# Patient Record
Sex: FEMALE | Race: WHITE | NOT HISPANIC OR LATINO | Employment: UNEMPLOYED | URBAN - METROPOLITAN AREA
[De-identification: names, ages, dates, MRNs, and addresses within clinical notes are randomized per-mention and may not be internally consistent; named-entity substitution may affect disease eponyms.]

---

## 2019-09-19 ENCOUNTER — HOSPITAL ENCOUNTER (OUTPATIENT)
Dept: URGENT CARE | Facility: CLINIC | Age: 29
Discharge: HOME | End: 2019-09-19
Attending: NURSE PRACTITIONER

## 2022-03-02 ENCOUNTER — TRANSCRIBE ORDERS (OUTPATIENT)
Dept: ADMINISTRATIVE | Facility: HOSPITAL | Age: 32
End: 2022-03-02

## 2022-03-02 ENCOUNTER — HOSPITAL ENCOUNTER (OUTPATIENT)
Dept: CARDIOLOGY | Facility: HOSPITAL | Age: 32
Discharge: HOME | End: 2022-03-02
Admitting: NURSE PRACTITIONER

## 2022-03-02 DIAGNOSIS — R07.9 CHEST PAIN, UNSPECIFIED TYPE: Primary | ICD-10-CM

## 2022-03-02 DIAGNOSIS — R07.9 CHEST PAIN, UNSPECIFIED TYPE: ICD-10-CM

## 2022-03-02 PROCEDURE — 93005 ELECTROCARDIOGRAM TRACING: CPT | Performed by: NURSE PRACTITIONER

## 2022-03-02 PROCEDURE — 93010 ELECTROCARDIOGRAM REPORT: CPT | Performed by: SPECIALIST

## 2022-03-16 ENCOUNTER — HOSPITAL ENCOUNTER (EMERGENCY)
Facility: HOSPITAL | Age: 32
Discharge: LEFT BEFORE DISCHARGE | End: 2022-03-16

## 2022-03-16 PROCEDURE — 99211 OFF/OP EST MAY X REQ PHY/QHP: CPT

## 2022-03-23 LAB — QT INTERVAL: 361 MS

## 2022-05-05 ENCOUNTER — HOSPITAL ENCOUNTER (EMERGENCY)
Facility: HOSPITAL | Age: 32
Discharge: HOME | End: 2022-05-05
Attending: EMERGENCY MEDICINE | Admitting: EMERGENCY MEDICINE

## 2022-05-05 VITALS
DIASTOLIC BLOOD PRESSURE: 44 MMHG | RESPIRATION RATE: 18 BRPM | TEMPERATURE: 98.2 F | SYSTOLIC BLOOD PRESSURE: 100 MMHG | OXYGEN SATURATION: 95 % | HEIGHT: 64 IN | HEART RATE: 71 BPM

## 2022-05-05 DIAGNOSIS — R56.9 SEIZURE: Primary | ICD-10-CM

## 2022-05-05 LAB
ALBUMIN SERPL-MCNC: 4 G/DL (ref 3.5–5.2)
ALBUMIN/GLOB SERPL: 1.1 G/DL
ALP SERPL-CCNC: 91 U/L (ref 39–117)
ALT SERPL W P-5'-P-CCNC: 16 U/L (ref 1–33)
ANION GAP SERPL CALCULATED.3IONS-SCNC: 13.9 MMOL/L (ref 5–15)
AST SERPL-CCNC: 17 U/L (ref 1–32)
BASOPHILS # BLD AUTO: 0.03 10*3/MM3 (ref 0–0.2)
BASOPHILS NFR BLD AUTO: 0.4 % (ref 0–1.5)
BILIRUB SERPL-MCNC: <0.2 MG/DL (ref 0–1.2)
BUN SERPL-MCNC: 11 MG/DL (ref 6–20)
BUN/CREAT SERPL: 11.5 (ref 7–25)
CALCIUM SPEC-SCNC: 9.6 MG/DL (ref 8.6–10.5)
CHLORIDE SERPL-SCNC: 101 MMOL/L (ref 98–107)
CK SERPL-CCNC: 143 U/L (ref 20–180)
CO2 SERPL-SCNC: 20.1 MMOL/L (ref 22–29)
CREAT SERPL-MCNC: 0.96 MG/DL (ref 0.57–1)
D-LACTATE SERPL-SCNC: 1.1 MMOL/L (ref 0.5–2)
DEPRECATED RDW RBC AUTO: 45.1 FL (ref 37–54)
EGFRCR SERPLBLD CKD-EPI 2021: 81.3 ML/MIN/1.73
EOSINOPHIL # BLD AUTO: 0.35 10*3/MM3 (ref 0–0.4)
EOSINOPHIL NFR BLD AUTO: 4.8 % (ref 0.3–6.2)
ERYTHROCYTE [DISTWIDTH] IN BLOOD BY AUTOMATED COUNT: 14.6 % (ref 12.3–15.4)
GLOBULIN UR ELPH-MCNC: 3.6 GM/DL
GLUCOSE SERPL-MCNC: 99 MG/DL (ref 65–99)
HCG INTACT+B SERPL-ACNC: <0.5 MIU/ML
HCT VFR BLD AUTO: 36.2 % (ref 34–46.6)
HGB BLD-MCNC: 12 G/DL (ref 12–15.9)
HOLD SPECIMEN: NORMAL
HOLD SPECIMEN: NORMAL
IMM GRANULOCYTES # BLD AUTO: 0.01 10*3/MM3 (ref 0–0.05)
IMM GRANULOCYTES NFR BLD AUTO: 0.1 % (ref 0–0.5)
LYMPHOCYTES # BLD AUTO: 2.59 10*3/MM3 (ref 0.7–3.1)
LYMPHOCYTES NFR BLD AUTO: 35.6 % (ref 19.6–45.3)
MAGNESIUM SERPL-MCNC: 2.3 MG/DL (ref 1.6–2.6)
MCH RBC QN AUTO: 28.4 PG (ref 26.6–33)
MCHC RBC AUTO-ENTMCNC: 33.1 G/DL (ref 31.5–35.7)
MCV RBC AUTO: 85.6 FL (ref 79–97)
MONOCYTES # BLD AUTO: 0.59 10*3/MM3 (ref 0.1–0.9)
MONOCYTES NFR BLD AUTO: 8.1 % (ref 5–12)
NEUTROPHILS NFR BLD AUTO: 3.71 10*3/MM3 (ref 1.7–7)
NEUTROPHILS NFR BLD AUTO: 51 % (ref 42.7–76)
NRBC BLD AUTO-RTO: 0 /100 WBC (ref 0–0.2)
PLATELET # BLD AUTO: 409 10*3/MM3 (ref 140–450)
PMV BLD AUTO: 10.9 FL (ref 6–12)
POTASSIUM SERPL-SCNC: 4.2 MMOL/L (ref 3.5–5.2)
PROLACTIN SERPL-MCNC: 13.3 NG/ML (ref 4.79–23.3)
PROT SERPL-MCNC: 7.6 G/DL (ref 6–8.5)
RBC # BLD AUTO: 4.23 10*6/MM3 (ref 3.77–5.28)
SODIUM SERPL-SCNC: 135 MMOL/L (ref 136–145)
T4 FREE SERPL-MCNC: 1.14 NG/DL (ref 0.93–1.7)
TSH SERPL DL<=0.05 MIU/L-ACNC: 0.9 UIU/ML (ref 0.27–4.2)
WBC NRBC COR # BLD: 7.28 10*3/MM3 (ref 3.4–10.8)
WHOLE BLOOD HOLD SPECIMEN: NORMAL
WHOLE BLOOD HOLD SPECIMEN: NORMAL

## 2022-05-05 PROCEDURE — 84439 ASSAY OF FREE THYROXINE: CPT | Performed by: EMERGENCY MEDICINE

## 2022-05-05 PROCEDURE — 85025 COMPLETE CBC W/AUTO DIFF WBC: CPT | Performed by: EMERGENCY MEDICINE

## 2022-05-05 PROCEDURE — 84146 ASSAY OF PROLACTIN: CPT | Performed by: EMERGENCY MEDICINE

## 2022-05-05 PROCEDURE — 83605 ASSAY OF LACTIC ACID: CPT | Performed by: EMERGENCY MEDICINE

## 2022-05-05 PROCEDURE — 99283 EMERGENCY DEPT VISIT LOW MDM: CPT

## 2022-05-05 PROCEDURE — 0 LEVETIRACETAM IN NACL 0.75% 1000 MG/100ML SOLUTION: Performed by: EMERGENCY MEDICINE

## 2022-05-05 PROCEDURE — 80053 COMPREHEN METABOLIC PANEL: CPT | Performed by: EMERGENCY MEDICINE

## 2022-05-05 PROCEDURE — 80177 DRUG SCRN QUAN LEVETIRACETAM: CPT | Performed by: EMERGENCY MEDICINE

## 2022-05-05 PROCEDURE — 83735 ASSAY OF MAGNESIUM: CPT | Performed by: EMERGENCY MEDICINE

## 2022-05-05 PROCEDURE — 82550 ASSAY OF CK (CPK): CPT | Performed by: EMERGENCY MEDICINE

## 2022-05-05 PROCEDURE — 84443 ASSAY THYROID STIM HORMONE: CPT | Performed by: EMERGENCY MEDICINE

## 2022-05-05 PROCEDURE — 96374 THER/PROPH/DIAG INJ IV PUSH: CPT

## 2022-05-05 PROCEDURE — 84702 CHORIONIC GONADOTROPIN TEST: CPT | Performed by: EMERGENCY MEDICINE

## 2022-05-05 RX ORDER — SODIUM CHLORIDE 0.9 % (FLUSH) 0.9 %
10 SYRINGE (ML) INJECTION AS NEEDED
Status: DISCONTINUED | OUTPATIENT
Start: 2022-05-05 | End: 2022-05-05 | Stop reason: HOSPADM

## 2022-05-05 RX ORDER — LEVETIRACETAM 10 MG/ML
1000 INJECTION INTRAVASCULAR EVERY 12 HOURS SCHEDULED
Status: DISCONTINUED | OUTPATIENT
Start: 2022-05-05 | End: 2022-05-05 | Stop reason: HOSPADM

## 2022-05-05 RX ADMIN — LEVETIRACETAM 1000 MG: 10 INJECTION INTRAVENOUS at 14:51

## 2022-05-05 NOTE — ED PROVIDER NOTES
Time: 2:29 PM EDT  Arrived by: private car; accompanied by significant other   Chief Complaint: SEIZURE   History provided by: pt and significant other   History is limited by: N/A     History of Present Illness:  Patient is a 32 y.o. female that presents to the emergency department with multiple episodes of SEIZURES today. Per significant other, pt has had 16 seizures. Pt is not actively seizing when seen in the ED. Seizures were witnessed by significant other. Significant other states that pt does not consistently eat or sleeps consistent.     Pt takes 2,000 mg Keppra daily and 100 mg Lamictal daily and significant other notes that pt is noncompliant with taking the medications. Pt also recently had a VNS stimulator placed.        History provided by:  Patient (significant other )  Seizures  Seizure activity on arrival: no    Seizure type:  Unable to specify  Severity:  Moderate  Timing:  Intermittent  Number of seizures this episode:  16  Progression:  Resolved  History of seizures: yes    Similar to previous episodes: yes    Severity:  Moderate  Home seizure meds: Lamictal and Keppra.  Compliance with current therapy:  Poor      Similar Symptoms Previously: Pt has hx of seizure. Per family, she has had seizures for the past year.   Recently seen: not recently seen in this ED     Patient Care Team  Primary Care Provider: Ninfa Yost APRN  Neurologist Swapnil Barry   Past Medical History:     No Known Allergies  No past medical history on file.  No past surgical history on file.  No family history on file.    Home Medications:  Prior to Admission medications    Not on File        Social History:      Recent travel: no     Review of Systems:  Review of Systems   Constitutional: Negative for chills and fever.   HENT: Negative for congestion, rhinorrhea and sore throat.    Eyes: Negative for pain and visual disturbance.   Respiratory: Negative for apnea, cough, chest tightness and shortness of breath.    Cardiovascular:  "Negative for chest pain and palpitations.   Gastrointestinal: Negative for abdominal pain, diarrhea, nausea and vomiting.   Genitourinary: Negative for difficulty urinating and dysuria.   Musculoskeletal: Negative for joint swelling and myalgias.   Skin: Negative for color change.   Neurological: Positive for seizures. Negative for headaches.   Psychiatric/Behavioral: Negative.    All other systems reviewed and are negative.       Physical Exam:  /54   Pulse 67   Temp 98.2 °F (36.8 °C) (Oral)   Resp 18   Ht 162.6 cm (64\")   LMP 05/04/2022   SpO2 96%     Physical Exam  Vitals and nursing note reviewed.   Constitutional:       General: She is not in acute distress.     Appearance: Normal appearance. She is not toxic-appearing.   HENT:      Head: Normocephalic and atraumatic.      Jaw: There is normal jaw occlusion.   Eyes:      General: Lids are normal.      Extraocular Movements: Extraocular movements intact.      Conjunctiva/sclera: Conjunctivae normal.      Pupils: Pupils are equal, round, and reactive to light.   Cardiovascular:      Rate and Rhythm: Normal rate and regular rhythm.      Pulses: Normal pulses.      Heart sounds: Normal heart sounds.   Pulmonary:      Effort: Pulmonary effort is normal. No respiratory distress.      Breath sounds: Normal breath sounds. No wheezing or rhonchi.   Abdominal:      General: Abdomen is flat.      Palpations: Abdomen is soft.      Tenderness: There is no abdominal tenderness. There is no guarding or rebound.   Musculoskeletal:         General: Normal range of motion.      Cervical back: Normal range of motion and neck supple.      Right lower leg: No edema.      Left lower leg: No edema.   Skin:     General: Skin is warm and dry.   Neurological:      Mental Status: She is alert and oriented to person, place, and time. Mental status is at baseline.      Motor: Weakness (generalized) present.      Comments: Pt follows commands.    Psychiatric:         Mood and " Affect: Mood normal.                Medications in the Emergency Department:  Medications   sodium chloride 0.9 % flush 10 mL (has no administration in time range)   levETIRAcetam in NaCl 0.75% (KEPPRA) IVPB 1,000 mg (0 mg Intravenous Stopped 5/5/22 1506)        Labs  Lab Results (last 24 hours)     Procedure Component Value Units Date/Time    CBC & Differential [[id number]]  (Normal) Collected: 05/05/22 1431    Specimen: Blood Updated: 05/05/22 1437    Narrative:      The following orders were created for panel order CBC & Differential.  Procedure                               Abnormality         Status                     ---------                               -----------         ------                     CBC Auto Differential[[id number]]        Normal              Final result                 Please view results for these tests on the individual orders.    Comprehensive Metabolic Panel [[id number]]  (Abnormal) Collected: 05/05/22 1431    Specimen: Blood Updated: 05/05/22 1559     Glucose 99 mg/dL      BUN 11 mg/dL      Creatinine 0.96 mg/dL      Sodium 135 mmol/L      Potassium 4.2 mmol/L      Comment: Slight hemolysis detected by analyzer. Results may be affected.        Chloride 101 mmol/L      CO2 20.1 mmol/L      Calcium 9.6 mg/dL      Total Protein 7.6 g/dL      Albumin 4.00 g/dL      ALT (SGPT) 16 U/L      AST (SGOT) 17 U/L      Alkaline Phosphatase 91 U/L      Total Bilirubin <0.2 mg/dL      Globulin 3.6 gm/dL      A/G Ratio 1.1 g/dL      BUN/Creatinine Ratio 11.5     Anion Gap 13.9 mmol/L      eGFR 81.3 mL/min/1.73      Comment: National Kidney Foundation and American Society of Nephrology (ASN) Task Force recommended calculation based on the Chronic Kidney Disease Epidemiology Collaboration (CKD-EPI) equation refit without adjustment for race.       Narrative:      GFR Normal >60  Chronic Kidney Disease <60  Kidney Failure <15      CBC Auto Differential [[id number]]  (Normal) Collected: 05/05/22 1431     Specimen: Blood Updated: 05/05/22 1437     WBC 7.28 10*3/mm3      RBC 4.23 10*6/mm3      Hemoglobin 12.0 g/dL      Hematocrit 36.2 %      MCV 85.6 fL      MCH 28.4 pg      MCHC 33.1 g/dL      RDW 14.6 %      RDW-SD 45.1 fl      MPV 10.9 fL      Platelets 409 10*3/mm3      Neutrophil % 51.0 %      Lymphocyte % 35.6 %      Monocyte % 8.1 %      Eosinophil % 4.8 %      Basophil % 0.4 %      Immature Grans % 0.1 %      Neutrophils, Absolute 3.71 10*3/mm3      Lymphocytes, Absolute 2.59 10*3/mm3      Monocytes, Absolute 0.59 10*3/mm3      Eosinophils, Absolute 0.35 10*3/mm3      Basophils, Absolute 0.03 10*3/mm3      Immature Grans, Absolute 0.01 10*3/mm3      nRBC 0.0 /100 WBC     hCG, Quantitative, Pregnancy [[id number]] Collected: 05/05/22 1431    Specimen: Blood Updated: 05/05/22 1500     HCG Quantitative <0.50 mIU/mL     Narrative:      HCG Ranges by Gestational Age    Females - non-pregnant premenopausal   </= 1mIU/mL HCG  Females - postmenopausal               </= 7mIU/mL HCG    3 Weeks       5.4   -      72 mIU/mL  4 Weeks      10.2   -     708 mIU/mL  5 Weeks       217   -   8,245 mIU/mL  6 Weeks       152   -  32,177 mIU/mL  7 Weeks     4,059   - 153,767 mIU/mL  8 Weeks    31,366   - 149,094 mIU/mL  9 Weeks    59,109   - 135,901 mIU/mL  10 Weeks   44,186   - 170,409 mIU/mL  12 Weeks   27,107   - 201,615 mIU/mL  14 Weeks   24,302   -  93,646 mIU/mL  15 Weeks   12,540   -  69,747 mIU/mL  16 Weeks    8,904   -  55,332 mIU/mL  17 Weeks    8,240   -  51,793 mIU/mL  18 Weeks    9,649   -  55,271 mIU/mL    Results may be falsely decreased if patient taking Biotin.      CK [[id number]]  (Normal) Collected: 05/05/22 1431    Specimen: Blood Updated: 05/05/22 1525     Creatine Kinase 143 U/L     Magnesium [[id number]]  (Normal) Collected: 05/05/22 1431    Specimen: Blood Updated: 05/05/22 1502     Magnesium 2.3 mg/dL     T4, Free [[id number]]  (Normal) Collected: 05/05/22 1431    Specimen: Blood Updated: 05/05/22 1507      Free T4 1.14 ng/dL     Narrative:      Results may be falsely increased if patient taking Biotin.      TSH [[id number]]  (Normal) Collected: 05/05/22 1431    Specimen: Blood Updated: 05/05/22 1506     TSH 0.901 uIU/mL     Levetiracetam Level (Keppra) [[id number]] Collected: 05/05/22 1454    Specimen: Blood Updated: 05/05/22 1504    Lactic Acid, Plasma [[id number]]  (Normal) Collected: 05/05/22 1454    Specimen: Blood Updated: 05/05/22 1520     Lactate 1.1 mmol/L     Prolactin [[id number]] Collected: 05/05/22 1454    Specimen: Blood Updated: 05/05/22 1504           Imaging:  No Radiology Exams Resulted Within Past 24 Hours    Procedures:  Procedures    Progress                            Medical Decision Making:  MDM  Number of Diagnoses or Management Options  Seizure (HCC)  Diagnosis management comments: The patient´s CBC was reviewed and shows no abnormalities of critical concern. Of note, there is no anemia requiring a blood transfusion and the platelet count is acceptable.  The patient´s CMP was reviewed and shows no abnormalities of critical concern. Of note, the patient´s sodium and potassium are acceptable. The patient´s liver enzymes are unremarkable. The patient´s renal function (creatinine) is preserved. The patient has a normal anion gap.  Lactic acid is 1.1.  Total CK is 143.  Patient was given Keppra in the emergency department.  Case was discussed with Dr. Barry who agrees that the patient can be discharged with close follow-up.  The patient is resting comfortably and feels better, is alert, talkative and in no distress. The repeat examination is unremarkable and benign. The patient is neurologically intact, has a normal mental status and this ambulatory in the ED. The history, exam, diagnostic testing in the patient's current condition do not suggest meningitis, stroke, sepsis, subarachnoid hemorrhage, intracranial bleeding, encephalitis or other significant pathology that would warrant further testing,  continued ED treatment, admission, neurological consultation, or other specialist evaluation at this point. The vital signs have been stable. The patient's condition is stable and appropriate for discharge. The patient will pursue further outpatient evaluation with the primary care physician or other designated or consulting position as indicated in the discharge instructions.       Amount and/or Complexity of Data Reviewed  Clinical lab tests: reviewed  Discussion of test results with the performing providers: yes  Discuss the patient with other providers: yes  Independent visualization of images, tracings, or specimens: yes    Risk of Complications, Morbidity, and/or Mortality  Presenting problems: moderate  Management options: moderate    Patient Progress  Patient progress: stable       Final diagnoses:   Seizure (HCC)        Disposition:  ED Disposition     ED Disposition   Discharge    Condition   Stable    Comment   --             Documentation assistance provided by Lety Garcia acting as scribe for Hammad Brooks MD. Information recorded by the scribe was done at my direction and has been verified and validated by me.        Lety Garcia  05/05/22 [id number]       Hammad Brooks MD  05/05/22 [id number]

## 2022-05-10 LAB — LEVETIRACETAM SERPL-MCNC: 13.8 UG/ML (ref 10–40)

## 2022-08-10 ENCOUNTER — HOSPITAL ENCOUNTER (EMERGENCY)
Facility: HOSPITAL | Age: 32
Discharge: PSYCHIATRIC OR SUBSTANCE USE FACILITY | End: 2022-08-10
Attending: EMERGENCY MEDICINE | Admitting: EMERGENCY MEDICINE

## 2022-08-10 ENCOUNTER — HOSPITAL ENCOUNTER (INPATIENT)
Facility: HOSPITAL | Age: 32
LOS: 1 days | Discharge: HOME | End: 2022-08-11
Attending: PSYCHIATRY & NEUROLOGY | Admitting: PSYCHIATRY & NEUROLOGY

## 2022-08-10 VITALS
TEMPERATURE: 98.5 F | DIASTOLIC BLOOD PRESSURE: 69 MMHG | WEIGHT: 271.83 LBS | OXYGEN SATURATION: 98 % | RESPIRATION RATE: 16 BRPM | HEART RATE: 80 BPM | HEIGHT: 64 IN | BODY MASS INDEX: 46.41 KG/M2 | SYSTOLIC BLOOD PRESSURE: 123 MMHG

## 2022-08-10 DIAGNOSIS — F43.25 ADJUSTMENT DISORDER WITH MIXED DISTURBANCE OF EMOTIONS AND CONDUCT: Primary | ICD-10-CM

## 2022-08-10 PROBLEM — Z85.72 HISTORY OF LYMPHOMA: Status: ACTIVE | Noted: 2022-08-10

## 2022-08-10 PROBLEM — F15.90 AMPHETAMINE MISUSE: Status: ACTIVE | Noted: 2022-08-10

## 2022-08-10 PROBLEM — Z85.79 HISTORY OF LYMPHOMA: Status: ACTIVE | Noted: 2022-08-10

## 2022-08-10 PROBLEM — F33.2 SEVERE RECURRENT MAJOR DEPRESSION WITHOUT PSYCHOTIC FEATURES (HCC): Status: ACTIVE | Noted: 2022-08-10

## 2022-08-10 PROBLEM — M32.9 LUPUS: Status: ACTIVE | Noted: 2022-08-10

## 2022-08-10 PROBLEM — G40.909 SEIZURE DISORDER: Status: ACTIVE | Noted: 2022-08-10

## 2022-08-10 PROBLEM — Z85.41 HISTORY OF CERVICAL CANCER: Status: ACTIVE | Noted: 2022-08-10

## 2022-08-10 PROBLEM — E06.3 HASHIMOTO'S THYROIDITIS: Status: ACTIVE | Noted: 2022-08-10

## 2022-08-10 PROBLEM — F12.10 MARIJUANA ABUSE: Status: ACTIVE | Noted: 2022-08-10

## 2022-08-10 PROBLEM — F43.10 POST TRAUMATIC STRESS DISORDER (PTSD): Status: ACTIVE | Noted: 2022-08-10

## 2022-08-10 PROBLEM — L73.2 HYDRADENITIS: Status: ACTIVE | Noted: 2022-08-10

## 2022-08-10 LAB
ALBUMIN SERPL-MCNC: 4.3 G/DL (ref 3.5–5.2)
ALBUMIN/GLOB SERPL: 1.3 G/DL
ALP SERPL-CCNC: 78 U/L (ref 39–117)
ALT SERPL W P-5'-P-CCNC: 24 U/L (ref 1–33)
AMPHET+METHAMPHET UR QL: POSITIVE
ANION GAP SERPL CALCULATED.3IONS-SCNC: 11.8 MMOL/L (ref 5–15)
APAP SERPL-MCNC: <5 MCG/ML (ref 0–30)
AST SERPL-CCNC: 30 U/L (ref 1–32)
B-HCG UR QL: NEGATIVE
BARBITURATES UR QL SCN: NEGATIVE
BASOPHILS # BLD AUTO: 0.04 10*3/MM3 (ref 0–0.2)
BASOPHILS NFR BLD AUTO: 0.4 % (ref 0–1.5)
BENZODIAZ UR QL SCN: NEGATIVE
BILIRUB SERPL-MCNC: 0.2 MG/DL (ref 0–1.2)
BILIRUB UR QL STRIP: NEGATIVE
BUN SERPL-MCNC: 7 MG/DL (ref 6–20)
BUN/CREAT SERPL: 9.3 (ref 7–25)
CALCIUM SPEC-SCNC: 9.6 MG/DL (ref 8.6–10.5)
CANNABINOIDS SERPL QL: POSITIVE
CHLORIDE SERPL-SCNC: 105 MMOL/L (ref 98–107)
CLARITY UR: CLEAR
CO2 SERPL-SCNC: 24.2 MMOL/L (ref 22–29)
COCAINE UR QL: NEGATIVE
COLOR UR: ABNORMAL
CREAT SERPL-MCNC: 0.75 MG/DL (ref 0.57–1)
DEPRECATED RDW RBC AUTO: 45 FL (ref 37–54)
EGFRCR SERPLBLD CKD-EPI 2021: 109.3 ML/MIN/1.73
EOSINOPHIL # BLD AUTO: 0.36 10*3/MM3 (ref 0–0.4)
EOSINOPHIL NFR BLD AUTO: 3.7 % (ref 0.3–6.2)
ERYTHROCYTE [DISTWIDTH] IN BLOOD BY AUTOMATED COUNT: 14.8 % (ref 12.3–15.4)
ETHANOL BLD-MCNC: <10 MG/DL (ref 0–10)
ETHANOL UR QL: <0.01 %
GLOBULIN UR ELPH-MCNC: 3.3 GM/DL
GLUCOSE SERPL-MCNC: 105 MG/DL (ref 65–99)
GLUCOSE UR STRIP-MCNC: NEGATIVE MG/DL
HCG INTACT+B SERPL-ACNC: <0.5 MIU/ML
HCT VFR BLD AUTO: 35.5 % (ref 34–46.6)
HGB BLD-MCNC: 12 G/DL (ref 12–15.9)
HGB UR QL STRIP.AUTO: NEGATIVE
HOLD SPECIMEN: NORMAL
HOLD SPECIMEN: NORMAL
IMM GRANULOCYTES # BLD AUTO: 0.02 10*3/MM3 (ref 0–0.05)
IMM GRANULOCYTES NFR BLD AUTO: 0.2 % (ref 0–0.5)
KETONES UR QL STRIP: NEGATIVE
LEUKOCYTE ESTERASE UR QL STRIP.AUTO: NEGATIVE
LYMPHOCYTES # BLD AUTO: 1.99 10*3/MM3 (ref 0.7–3.1)
LYMPHOCYTES NFR BLD AUTO: 20.3 % (ref 19.6–45.3)
MCH RBC QN AUTO: 28.3 PG (ref 26.6–33)
MCHC RBC AUTO-ENTMCNC: 33.8 G/DL (ref 31.5–35.7)
MCV RBC AUTO: 83.7 FL (ref 79–97)
METHADONE UR QL SCN: NEGATIVE
MONOCYTES # BLD AUTO: 0.77 10*3/MM3 (ref 0.1–0.9)
MONOCYTES NFR BLD AUTO: 7.9 % (ref 5–12)
NEUTROPHILS NFR BLD AUTO: 6.62 10*3/MM3 (ref 1.7–7)
NEUTROPHILS NFR BLD AUTO: 67.5 % (ref 42.7–76)
NITRITE UR QL STRIP: NEGATIVE
NRBC BLD AUTO-RTO: 0 /100 WBC (ref 0–0.2)
OPIATES UR QL: NEGATIVE
OXYCODONE UR QL SCN: NEGATIVE
PH UR STRIP.AUTO: 6.5 [PH] (ref 5–8)
PLATELET # BLD AUTO: 361 10*3/MM3 (ref 140–450)
PMV BLD AUTO: 10.8 FL (ref 6–12)
POTASSIUM SERPL-SCNC: 3.4 MMOL/L (ref 3.5–5.2)
PROT SERPL-MCNC: 7.6 G/DL (ref 6–8.5)
PROT UR QL STRIP: NEGATIVE
RBC # BLD AUTO: 4.24 10*6/MM3 (ref 3.77–5.28)
SALICYLATES SERPL-MCNC: <0.3 MG/DL
SARS-COV-2 RNA RESP QL NAA+PROBE: NOT DETECTED
SODIUM SERPL-SCNC: 141 MMOL/L (ref 136–145)
SP GR UR STRIP: <=1.005 (ref 1–1.03)
T4 FREE SERPL-MCNC: 1.33 NG/DL (ref 0.93–1.7)
TSH SERPL DL<=0.05 MIU/L-ACNC: 1.28 UIU/ML (ref 0.27–4.2)
UROBILINOGEN UR QL STRIP: ABNORMAL
WBC NRBC COR # BLD: 9.8 10*3/MM3 (ref 3.4–10.8)
WHOLE BLOOD HOLD COAG: NORMAL
WHOLE BLOOD HOLD SPECIMEN: NORMAL

## 2022-08-10 PROCEDURE — 84443 ASSAY THYROID STIM HORMONE: CPT | Performed by: PSYCHIATRY & NEUROLOGY

## 2022-08-10 PROCEDURE — U0003 INFECTIOUS AGENT DETECTION BY NUCLEIC ACID (DNA OR RNA); SEVERE ACUTE RESPIRATORY SYNDROME CORONAVIRUS 2 (SARS-COV-2) (CORONAVIRUS DISEASE [COVID-19]), AMPLIFIED PROBE TECHNIQUE, MAKING USE OF HIGH THROUGHPUT TECHNOLOGIES AS DESCRIBED BY CMS-2020-01-R: HCPCS | Performed by: EMERGENCY MEDICINE

## 2022-08-10 PROCEDURE — 80053 COMPREHEN METABOLIC PANEL: CPT | Performed by: EMERGENCY MEDICINE

## 2022-08-10 PROCEDURE — 80143 DRUG ASSAY ACETAMINOPHEN: CPT | Performed by: EMERGENCY MEDICINE

## 2022-08-10 PROCEDURE — 80307 DRUG TEST PRSMV CHEM ANLYZR: CPT | Performed by: EMERGENCY MEDICINE

## 2022-08-10 PROCEDURE — 81003 URINALYSIS AUTO W/O SCOPE: CPT | Performed by: EMERGENCY MEDICINE

## 2022-08-10 PROCEDURE — C9803 HOPD COVID-19 SPEC COLLECT: HCPCS

## 2022-08-10 PROCEDURE — 80179 DRUG ASSAY SALICYLATE: CPT | Performed by: EMERGENCY MEDICINE

## 2022-08-10 PROCEDURE — 82077 ASSAY SPEC XCP UR&BREATH IA: CPT | Performed by: EMERGENCY MEDICINE

## 2022-08-10 PROCEDURE — 81025 URINE PREGNANCY TEST: CPT | Performed by: PSYCHIATRY & NEUROLOGY

## 2022-08-10 PROCEDURE — 85025 COMPLETE CBC W/AUTO DIFF WBC: CPT | Performed by: EMERGENCY MEDICINE

## 2022-08-10 PROCEDURE — 84439 ASSAY OF FREE THYROXINE: CPT | Performed by: PSYCHIATRY & NEUROLOGY

## 2022-08-10 PROCEDURE — 99284 EMERGENCY DEPT VISIT MOD MDM: CPT

## 2022-08-10 PROCEDURE — 84702 CHORIONIC GONADOTROPIN TEST: CPT | Performed by: EMERGENCY MEDICINE

## 2022-08-10 RX ORDER — DIPHENHYDRAMINE HCL 50 MG
50 CAPSULE ORAL EVERY 4 HOURS PRN
Status: DISCONTINUED | OUTPATIENT
Start: 2022-08-10 | End: 2022-08-11 | Stop reason: HOSPADM

## 2022-08-10 RX ORDER — LAMOTRIGINE 100 MG/1
200 TABLET ORAL DAILY
Status: DISCONTINUED | OUTPATIENT
Start: 2022-08-10 | End: 2022-08-10

## 2022-08-10 RX ORDER — LORAZEPAM 2 MG/ML
2 INJECTION INTRAMUSCULAR EVERY 4 HOURS PRN
Status: DISCONTINUED | OUTPATIENT
Start: 2022-08-10 | End: 2022-08-11 | Stop reason: HOSPADM

## 2022-08-10 RX ORDER — LEVETIRACETAM 250 MG/1
2000 TABLET ORAL 2 TIMES DAILY
Status: ON HOLD | COMMUNITY
End: 2022-08-11 | Stop reason: SDUPTHER

## 2022-08-10 RX ORDER — DIPHENHYDRAMINE HYDROCHLORIDE 50 MG/ML
50 INJECTION INTRAMUSCULAR; INTRAVENOUS EVERY 4 HOURS PRN
Status: DISCONTINUED | OUTPATIENT
Start: 2022-08-10 | End: 2022-08-11 | Stop reason: HOSPADM

## 2022-08-10 RX ORDER — SODIUM CHLORIDE 0.9 % (FLUSH) 0.9 %
10 SYRINGE (ML) INJECTION AS NEEDED
Status: DISCONTINUED | OUTPATIENT
Start: 2022-08-10 | End: 2022-08-10 | Stop reason: HOSPADM

## 2022-08-10 RX ORDER — LEVETIRACETAM 500 MG/1
1000 TABLET ORAL 2 TIMES DAILY
Status: DISCONTINUED | OUTPATIENT
Start: 2022-08-10 | End: 2022-08-11 | Stop reason: HOSPADM

## 2022-08-10 RX ORDER — HYDROXYZINE PAMOATE 50 MG/1
50 CAPSULE ORAL EVERY 6 HOURS PRN
Status: DISCONTINUED | OUTPATIENT
Start: 2022-08-10 | End: 2022-08-11 | Stop reason: HOSPADM

## 2022-08-10 RX ORDER — LOPERAMIDE HYDROCHLORIDE 2 MG/1
2 CAPSULE ORAL
Status: DISCONTINUED | OUTPATIENT
Start: 2022-08-10 | End: 2022-08-11 | Stop reason: HOSPADM

## 2022-08-10 RX ORDER — NICOTINE 21 MG/24HR
1 PATCH, TRANSDERMAL 24 HOURS TRANSDERMAL
Status: DISCONTINUED | OUTPATIENT
Start: 2022-08-10 | End: 2022-08-11 | Stop reason: HOSPADM

## 2022-08-10 RX ORDER — LORAZEPAM 2 MG/1
2 TABLET ORAL EVERY 4 HOURS PRN
Status: DISCONTINUED | OUTPATIENT
Start: 2022-08-10 | End: 2022-08-11 | Stop reason: HOSPADM

## 2022-08-10 RX ORDER — TRAZODONE HYDROCHLORIDE 50 MG/1
100 TABLET ORAL NIGHTLY PRN
Status: DISCONTINUED | OUTPATIENT
Start: 2022-08-10 | End: 2022-08-11 | Stop reason: HOSPADM

## 2022-08-10 RX ORDER — LURASIDONE HYDROCHLORIDE 120 MG/1
120 TABLET, FILM COATED ORAL
COMMUNITY

## 2022-08-10 RX ORDER — SODIUM CHLORIDE 0.9 % (FLUSH) 0.9 %
10 SYRINGE (ML) INJECTION AS NEEDED
Status: DISCONTINUED | OUTPATIENT
Start: 2022-08-10 | End: 2022-08-10

## 2022-08-10 RX ORDER — HALOPERIDOL 5 MG/ML
5 INJECTION INTRAMUSCULAR EVERY 4 HOURS PRN
Status: DISCONTINUED | OUTPATIENT
Start: 2022-08-10 | End: 2022-08-11 | Stop reason: HOSPADM

## 2022-08-10 RX ORDER — LEVETIRACETAM 500 MG/1
2000 TABLET ORAL 2 TIMES DAILY
Status: DISCONTINUED | OUTPATIENT
Start: 2022-08-10 | End: 2022-08-10

## 2022-08-10 RX ORDER — ACETAMINOPHEN 325 MG/1
650 TABLET ORAL EVERY 4 HOURS PRN
Status: DISCONTINUED | OUTPATIENT
Start: 2022-08-10 | End: 2022-08-11 | Stop reason: HOSPADM

## 2022-08-10 RX ORDER — ALUMINA, MAGNESIA, AND SIMETHICONE 2400; 2400; 240 MG/30ML; MG/30ML; MG/30ML
15 SUSPENSION ORAL EVERY 6 HOURS PRN
Status: DISCONTINUED | OUTPATIENT
Start: 2022-08-10 | End: 2022-08-11 | Stop reason: HOSPADM

## 2022-08-10 RX ORDER — LAMOTRIGINE 200 MG/1
200 TABLET ORAL DAILY
Status: ON HOLD | COMMUNITY
End: 2022-08-11 | Stop reason: SDUPTHER

## 2022-08-10 RX ORDER — HALOPERIDOL 5 MG/1
5 TABLET ORAL EVERY 4 HOURS PRN
Status: DISCONTINUED | OUTPATIENT
Start: 2022-08-10 | End: 2022-08-11 | Stop reason: HOSPADM

## 2022-08-10 RX ORDER — LAMOTRIGINE 100 MG/1
200 TABLET ORAL 2 TIMES DAILY
Status: DISCONTINUED | OUTPATIENT
Start: 2022-08-10 | End: 2022-08-11

## 2022-08-10 RX ADMIN — NICOTINE 1 PATCH: 21 PATCH, EXTENDED RELEASE TRANSDERMAL at 16:01

## 2022-08-10 RX ADMIN — LAMOTRIGINE 200 MG: 100 TABLET ORAL at 22:16

## 2022-08-10 RX ADMIN — LEVETIRACETAM 1000 MG: 500 TABLET, FILM COATED ORAL at 22:16

## 2022-08-10 NOTE — ED NOTES
PT placed in 72 hour hold. Patient is agitated and demanding to leave. Patient informed that she is not allowed to leave. Security notified and provider notified.
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
## 2022-08-10 NOTE — SIGNIFICANT NOTE
08/10/22 1526   Plan   Plan Web referral follow up #[id number]. the report submitted DOES meet acceptance criteria for further assessment

## 2022-08-10 NOTE — SIGNIFICANT NOTE
08/10/22 2735   Plan   Plan [facility] was contacted to evaluate pt. [facility] recommended higher level of care adn CPS referral to be made. CPS referral made on this date. web referral #[id number]

## 2022-08-11 VITALS
HEIGHT: 64 IN | OXYGEN SATURATION: 100 % | TEMPERATURE: 97.5 F | WEIGHT: 271.83 LBS | HEART RATE: 55 BPM | DIASTOLIC BLOOD PRESSURE: 69 MMHG | RESPIRATION RATE: 18 BRPM | SYSTOLIC BLOOD PRESSURE: 144 MMHG | BODY MASS INDEX: 46.41 KG/M2

## 2022-08-11 RX ORDER — LAMOTRIGINE 100 MG/1
100 TABLET ORAL 2 TIMES DAILY
Status: DISCONTINUED | OUTPATIENT
Start: 2022-08-11 | End: 2022-08-11 | Stop reason: HOSPADM

## 2022-08-11 RX ORDER — LEVETIRACETAM 250 MG/1
1000 TABLET ORAL 2 TIMES DAILY
Start: 2022-08-11

## 2022-08-11 RX ORDER — LAMOTRIGINE 200 MG/1
100 TABLET ORAL 2 TIMES DAILY
Start: 2022-08-11

## 2022-08-11 RX ADMIN — SERTRALINE HYDROCHLORIDE 50 MG: 50 TABLET ORAL at 09:27

## 2022-08-11 RX ADMIN — NICOTINE 1 PATCH: 21 PATCH, EXTENDED RELEASE TRANSDERMAL at 09:35

## 2022-08-11 RX ADMIN — LEVETIRACETAM 1000 MG: 500 TABLET, FILM COATED ORAL at 09:28

## 2022-08-11 RX ADMIN — HYDROXYZINE PAMOATE 50 MG: 50 CAPSULE ORAL at 02:50

## 2022-08-11 RX ADMIN — LAMOTRIGINE 100 MG: 100 TABLET ORAL at 09:29
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
## 2022-08-11 NOTE — PLAN OF CARE
"Goal Outcome Evaluation:  Plan of Care Reviewed With: patient  Patient Agreement with Plan of Care: agrees (pt is being discharged)         NURSING NOTE:  PT REPORTS HER MOOD IS \"GREAT\" THIS MORNING.  RATES DEPRESSION A 0-1, ANXIETY A 2-3 AND REPORTS THAT SHE HAS HOPE FOR HER FUTURE.  DENIES CURRENT SI, HI, OR HALLUCINATIONS.  STATES SHE HAS A FOLLOWUP APPOINTMENT WITH [facility] ON Friday.  COOPERATIVE WITH AM  MEDICATIONS.   COLLECTED COLLATERAL INFORMATION. DR. IVEY HERE AND SPOKE WITH PT.  DISCHARGE ORDER IS WRITTEN.  PT HAS COMPLETED HER SAFETY PLAN.  PT EVALUATED AND GOALS ARE MET FOR INPT STAY.    "

## 2022-08-11 NOTE — PLAN OF CARE
Goal Outcome Evaluation:      Pt is withdrawn to her room this evening. She is cooperative with staff and medications. Tearful at times, stating she wants to get back home to her son. She denies SI/HI or hallucinations. Rates anxiety and depression both 3/10. She is able to make her needs known. Currently resting in bed with eyes closed, will continue to monitor. Pt gave staff permission to call her mother Cookie Guzman for collateral information in the morning  at [phone].

## 2022-08-11 NOTE — CASE MANAGEMENT/SOCIAL WORK
Discharge Planning/Psychosocial  Assessment   Joshua     Patient Name: Sundar Guzman  MRN: [id number]  Today's Date: 8/11/2022    Admit Date: 8/10/2022     Discharge Needs Assessment    Outpatient mental health treatment.                Discharge Plan     Row Name 08/11/22 0944       Plan    Plan return home with javed and 13 yo son.    Patient/Family in Agreement with Plan yes              Continued Care and Services - Admitted Since 8/10/2022    Coordination has not been started for this encounter.         31 yo female, lives with javed of 7 years and 13 yo son in [place]. Mother lives locally  Patient reports that she has appointments scheduled with [facility] for therapy and medication management.           Functional Status    No documentation.                Psychosocial    Pt has had significant change and loss within the last year. She was terminated from her job which she loved, she has had significant health issues and that has diminished her sense of herself. She reports good support from mother and javed. She is motivated to go to outpatient treatment and is able to clearly state reasonable benefits anticipated with this treatment.   Patient states that she is connected to [facility]                 Abuse/Neglect     Row Name 08/11/22 0931       Personal Safety    Feels Unsafe at Home or Work/School no    Feels Threatened by Someone no    Does Anyone Try to Keep You From Having Contact with Others or Doing Things Outside Your Home? no    Physical Signs of Abuse Present no               Legal     Row Name 08/11/22 4258       Financial/Legal    Source of Income none;other (see comments)  Javed works and assists with household expenses    Public Assistance Pending Number Pt applied for SSD last year, was denied. She currently has an [employer] working on application    Finance Comments Pt had loss of income within the last year due to seizure disorder, being unable to work due to  health conditions       Legal    Criminal Activity/Legal Involvement none               Substance Abuse     Row Name 08/11/22 0931       AUDIT-C (Alcohol Use Disorders ID Test)    Q1: How often do you have a drink containing alcohol? Never                              Spoke with patients mother. Mother reported that Sundar has been through a difficult time since health condition has worsened . She knows that she has been depressed. She does not believe that she was trying to harm herself. She believes that it was an accidental discharge  Mother confirmed that boyfriend was home at the time of the incident but that her 11 yo was not in the home.  She feels that pt needs medication and therapy ut that outpatient would be appropriate. Mother reports that firearms have been removed from Sundar home, she has no access. Mother will pick patient up and supports a plan for discharge.     Namrata Michel LCSW

## 2022-11-18 NOTE — PROGRESS NOTES
"Well Woman Visit    CC: Scheduled annual well gyn visit  Chief Complaint   Patient presents with   • Gynecologic Exam     Discuss Nexplanon Removal, appointment scheduled 22       Myriad intake in the past?: N/A  Not done today due to: New pt    HPI:   32 y.o.   Social History     Substance and Sexual Activity   Sexual Activity Yes   • Birth control/protection: Nexplanon       31 y/o  with LMP 10/5/22 here for AP.  Menses irregular.  Pt has nexplanon she desires removal.  She believes it has .    History: PMHx, Meds, Allergies, PSHx, Social Hx, and POBHx all reviewed and updated.    Pt has no complaints today.    PHYSICAL EXAM:  /85   Pulse 114   Ht 162.6 cm (64\")   Wt (!) 136 kg (300 lb 12.8 oz)   LMP 10/05/2022 Comment: Irregular cycles w/ nexplanon  Breastfeeding No   BMI 51.63 kg/m²  Not found.  General- NAD, alert and oriented, appropriate  Psych- Normal mood, good memory  Neck- No masses, no thyroid enlargement  CV- Regular rhythm, no murnurs  Resp- CTA to bases, no wheezes  Abdomen- Soft, non distended, non tender, no masses    Breast left-  Bilaterally symmetrical, no masses, non tender, no nipple discharge  Breast right- Bilaterally symmetrical, no masses, non tender, no nipple discharge + large pigmented nevi (pt states has been checked by derm in past)    External genitalia- Normal female, no lesions  Urethra/meatus- Normal, no masses, non tender  Bladder- Normal, no masses, non tender  Vagina- Normal, no atrophy, no lesions, no discharge.  Prolapse: none  Cvx- Normal, no lesions, no discharge, No cervical motion tenderness  Uterus- Normal size, shape & consistency.  Non tender, mobile, & no prolapse  Adnexa- No mass, non tender  Anus/Rectum/Perineum- Not performed    Lymphatic- No palpable neck, axillary, or groin nodes  Ext- No edema, no cyanosis    Skin- No lesions, no rashes, no acanthosis nigricans  NEXPLANON- palpable in appropriate location, left arm    ASSESSMENT " and PLAN:    Diagnoses and all orders for this visit:    1. Well woman exam with routine gynecological exam (Primary)  -     IgP, Aptima HPV    2. Melanocytic nevi of trunk   Recommend patient seek f/u with derm for possible removal        Preventative:  • BREAST HEALTH- Monthly self breast exam importance and how to reviewed. MMG and/or MRI (prn) reviewed per society guidelines and her individual history. Screen: Not medically needed  • CERVICAL CANCER Screening- Reviewed current ASCCP guidelines for screening w and wo cotest HPV, age specific.  Screen: Updated today  • COLON CANCER Screening- Reviewed current medical society guidelines and options.  Screen:  Updated today, Not medically needed  • SMOKING STATUS- pt does use nicotine and/or tobacco.  I reviewed importance of avoiding.  Recommend FU w PCP regarding quitting options if unable to quit wo assistance.  Does does qualify (age 50-80, 20pack yr hx, current or former smoker, quit w/in last 15yrs, no symptoms of lung CA) for low dose CT of chest.  If qualifies, I recommend pt FU w PCP to schedule/monitor screening for lung CA  • Follow up PCP/Specialist PMHx and Labs  MYRIAD: Does not qualify.      She understands the importance of having any ordered tests to be performed in a timely fashion.  The risks of not performing them include, but are not limited to, advanced cancer stages, bone loss from osteoporosis and/or subsequent increase in morbidity and/or mortality.  She is encouraged to review her results online and/or contact or office if she has questions.     Follow Up:  Return f/u for nexplanon removal.            Maria Alejandra Chaparro,   11/21/2022    [facility]  [address] [place] [address]  Dept: [phone]  Dept Fax: [phone]  Loc: [phone]  Loc Fax: [phone]

## 2022-11-21 ENCOUNTER — OFFICE VISIT (OUTPATIENT)
Dept: OBSTETRICS AND GYNECOLOGY | Facility: CLINIC | Age: 32
End: 2022-11-21

## 2022-11-21 VITALS
SYSTOLIC BLOOD PRESSURE: 128 MMHG | DIASTOLIC BLOOD PRESSURE: 85 MMHG | WEIGHT: 293 LBS | BODY MASS INDEX: 50.02 KG/M2 | HEART RATE: 114 BPM | HEIGHT: 64 IN

## 2022-11-21 DIAGNOSIS — Z01.419 WELL WOMAN EXAM WITH ROUTINE GYNECOLOGICAL EXAM: Primary | ICD-10-CM

## 2022-11-21 DIAGNOSIS — D22.5 MELANOCYTIC NEVI OF TRUNK: ICD-10-CM

## 2022-11-21 PROCEDURE — 2014F MENTAL STATUS ASSESS: CPT | Performed by: OBSTETRICS & GYNECOLOGY

## 2022-11-21 PROCEDURE — 3008F BODY MASS INDEX DOCD: CPT | Performed by: OBSTETRICS & GYNECOLOGY

## 2022-11-21 PROCEDURE — 99385 PREV VISIT NEW AGE 18-39: CPT | Performed by: OBSTETRICS & GYNECOLOGY

## 2022-11-21 PROCEDURE — 87624 HPV HI-RISK TYP POOLED RSLT: CPT | Performed by: OBSTETRICS & GYNECOLOGY

## 2022-11-21 PROCEDURE — G0123 SCREEN CERV/VAG THIN LAYER: HCPCS | Performed by: OBSTETRICS & GYNECOLOGY

## 2022-11-21 RX ORDER — ONDANSETRON 8 MG/1
TABLET, ORALLY DISINTEGRATING ORAL
COMMUNITY

## 2022-11-21 RX ORDER — CLINDAMYCIN PHOSPHATE 10 MG/G
GEL TOPICAL
COMMUNITY

## 2022-11-21 RX ORDER — HYDROXYZINE HYDROCHLORIDE 25 MG/1
TABLET, FILM COATED ORAL
COMMUNITY
Start: 2022-09-26

## 2022-11-21 RX ORDER — BUPROPION HYDROCHLORIDE 150 MG/1
TABLET ORAL
COMMUNITY
Start: 2022-09-26

## 2022-11-21 RX ORDER — PRAZOSIN HYDROCHLORIDE 2 MG/1
CAPSULE ORAL
COMMUNITY
Start: 2022-09-26

## 2022-11-21 RX ORDER — ETONOGESTREL 68 MG/1
IMPLANT SUBCUTANEOUS
COMMUNITY

## 2022-11-21 RX ORDER — OMEPRAZOLE 40 MG/1
CAPSULE, DELAYED RELEASE ORAL
COMMUNITY
Start: 2022-09-26

## 2022-11-22 ENCOUNTER — TELEPHONE (OUTPATIENT)
Dept: OBSTETRICS AND GYNECOLOGY | Facility: CLINIC | Age: 32
End: 2022-11-22

## 2022-11-22 NOTE — TELEPHONE ENCOUNTER
Patient called 11-21-22 pm.  She stated was seen today and you discussed an Rx for Clindamycin gel for her HS but nothing was sent to her pharmacy [facility].  Called patient no answer.  Wanted to let her know sent message to Dr Chaparro.

## 2022-11-23 NOTE — TELEPHONE ENCOUNTER
Called patient informed her Dr Chaparro said she needed to get Clindamycin from provider that gave it to her.

## 2022-12-01 LAB
CYTOLOGIST CVX/VAG CYTO: NORMAL
CYTOLOGY CVX/VAG DOC CYTO: NORMAL
CYTOLOGY CVX/VAG DOC THIN PREP: NORMAL
DX ICD CODE: NORMAL
HIV 1 & 2 AB SER-IMP: NORMAL
HPV I/H RISK 4 DNA CVX QL PROBE+SIG AMP: NEGATIVE
OTHER STN SPEC: NORMAL
STAT OF ADQ CVX/VAG CYTO-IMP: NORMAL

## 2022-12-09 ENCOUNTER — PROCEDURE VISIT (OUTPATIENT)
Dept: OBSTETRICS AND GYNECOLOGY | Facility: CLINIC | Age: 32
End: 2022-12-09

## 2022-12-09 VITALS
BODY MASS INDEX: 50.02 KG/M2 | HEIGHT: 64 IN | SYSTOLIC BLOOD PRESSURE: 131 MMHG | HEART RATE: 90 BPM | DIASTOLIC BLOOD PRESSURE: 92 MMHG | WEIGHT: 293 LBS

## 2022-12-09 DIAGNOSIS — Z01.812 PRE-PROCEDURE LAB EXAM: Primary | ICD-10-CM

## 2022-12-09 PROCEDURE — 11982 REMOVE DRUG IMPLANT DEVICE: CPT | Performed by: OBSTETRICS & GYNECOLOGY

## 2022-12-09 RX ORDER — SUMATRIPTAN 100 MG/1
TABLET, FILM COATED ORAL
COMMUNITY

## 2022-12-10 NOTE — PROGRESS NOTES
Subdermal Contraceptive Implant  Removal    Date of Insertion:  Unknown pt states it is   Date of Removal:  December 10, 2022    Information related to removal of the implant:   Reason(s) for removal:  Product life completed  Was implant palpable before removal?  Yes    Procedure Time Out Documentation       Procedure:    Implant identified.  Left upper arm prepped with Betadinex3.   1% lidocaine injected at planned incision site.  A vertical incision 2 mm was performed with a scalpel at the distal end of implant.  The implant was removed using a hemostat. The implant was inspected and found to be intact and complete.  Steri strips and a pressure dressing were applied to the site.  After removal instructions were given and verbally reviewed with the patient who acknowledged her understanding.    Difficulties with the implant removal procedure?  no    Patient tolerated the procedure well without complications.

## 2023-05-10 NOTE — PROGRESS NOTES
"GYN Visit    No chief complaint on file.      HPI:   33 y.o. LMP: No LMP recorded.     Social History     Substance and Sexual Activity   Sexual Activity Yes   • Birth control/protection: Nexplanon       Menses:   q *** days, lasts *** days, changes products q ***hrs on heaviest days.   Pain:  {APPAIN:67683}  {Urinary QUALITY measure 64yo (Optional):13500}  ***    History: PMHx, Meds, Allergies, PSHx, Social Hx, and POBHx all reviewed and updated.    PHYSICAL EXAM:  There were no vitals taken for this visit.  General- NAD, alert and oriented, appropriate  Psych- Normal mood, good memory    Neck- No masses, no thyroid enlargement  Cardiovascular- Regular rhythm, no murnurs  Respiratory- CTA to bases, no wheezes  Abdomen- Soft, non distended, non tender, no masses    Breast left-  Bilaterally symmetrical, no masses, non tender, no nipple discharge, no axillary or supraclavicular nodes palpable  Breast right- Bilaterally symmetrical, no masses, non tender, no nipple discharge, no axillary or supraclavicular nodes palpable    External genitalia- Normal female, no lesions  Urethra/meatus- Normal, no masses, non tender, no prolapse  Bladder- Normal, no masses, non tender, no prolapse  Vagina- Normal, no atrophy, no lesions, no discharge, no prolapse  Cervix- {APCVX:59089}  Uterus- {APUTERUS:22359} {Bedside US (Optional):03385::\"INDICATION:  size inconsistent with dates\",\"COMPARISON: none\",\"METHOD: Transvaginal\",\"FINDINGS: ***\",\"IMPRESSION: ***\"}  Adnexa- {APADNEXA:15688}  Anus/Rectum/Perineum- {APRECTAL:55808}    Lymphatic- No palpable groin nodes  Extremities- No edema, no cyanosis    Skin- No lesions, no rashes  {APNEXPLANON (Optional):98694}      CT Abdomen & Pelvis W IV Contrast Without Oral Contrast (2023 15:44)       ASSESSMENT AND PLAN:  There are no diagnoses linked to this encounter.  {APGYNCOUNSELING (Optional):03437}  {Ectopic/MTX Treatment/FU/Plan (Optional):62648}  Follow Up:  No follow-ups on " file.    {Time Spent-Use for E/M Coding REQUIRED IF TELEMED (Optional):49714}  {Separate E+M Time Carve Out (Optional):73902}    Yasmani Torres MA  05/11/2023    [facility]  [address] [place] [address]  Dept: [phone]  Dept Fax: [phone]  Loc: [phone]  Loc Fax: [phone]

## 2023-05-11 ENCOUNTER — OFFICE VISIT (OUTPATIENT)
Dept: OBSTETRICS AND GYNECOLOGY | Facility: CLINIC | Age: 33
End: 2023-05-11
Payer: COMMERCIAL

## 2023-05-11 VITALS
HEART RATE: 86 BPM | SYSTOLIC BLOOD PRESSURE: 135 MMHG | DIASTOLIC BLOOD PRESSURE: 83 MMHG | HEIGHT: 64 IN | BODY MASS INDEX: 46.13 KG/M2 | WEIGHT: 270.2 LBS

## 2023-05-11 DIAGNOSIS — N83.201 CYST OF RIGHT OVARY: ICD-10-CM

## 2023-05-11 DIAGNOSIS — R10.2 PELVIC PAIN: Primary | ICD-10-CM

## 2023-05-11 DIAGNOSIS — N92.0 MENORRHAGIA WITH REGULAR CYCLE: ICD-10-CM

## 2023-05-11 PROCEDURE — 1159F MED LIST DOCD IN RCRD: CPT | Performed by: OBSTETRICS & GYNECOLOGY

## 2023-05-11 PROCEDURE — 1160F RVW MEDS BY RX/DR IN RCRD: CPT | Performed by: OBSTETRICS & GYNECOLOGY

## 2023-05-11 PROCEDURE — 99214 OFFICE O/P EST MOD 30 MIN: CPT | Performed by: OBSTETRICS & GYNECOLOGY

## 2023-05-11 RX ORDER — LAMOTRIGINE 100 MG/1
TABLET ORAL
COMMUNITY
Start: 2023-05-09 | End: 2023-05-11 | Stop reason: SDUPTHER

## 2023-05-11 RX ORDER — SULFASALAZINE 500 MG/1
TABLET, DELAYED RELEASE ORAL
COMMUNITY
Start: 2023-04-30

## 2023-05-11 RX ORDER — ERGOCALCIFEROL 1.25 MG/1
CAPSULE ORAL
COMMUNITY
Start: 2023-04-04

## 2023-05-11 NOTE — PROGRESS NOTES
"GYN Visit    Chief Complaint   Patient presents with   • Follow-up     FU ER for right ovarian cysts. They cause patient significant pain.       HPI:       32 y/o here for pelvic pain.  Pt states had sudden onset pelvic pain x 3 days went to ER and had CT scan and here for f/u. Still having pain not as bad.  Pt also having rectal bleeding has appt with GI. LMP 5/8/23.  Menses q mo x 4-8 days with 4-7 days heavy using super + tampons and overnight pads changing q 1 hr with pads changing q2 hours. + clotting + cramping      History: PMHx, Meds, Allergies, PSHx, Social Hx, and POBHx all reviewed and updated.    PHYSICAL EXAM:  /83   Pulse 86   Ht 162.6 cm (64\")   Wt 123 kg (270 lb 3.2 oz)   LMP 05/08/2023 (Exact Date)   BMI 46.38 kg/m²   General- NAD, alert and oriented, appropriate  Psych- Normal mood, good memory    Pt declines gyn exam today due to bleeding    CT Abdomen & Pelvis W IV Contrast Without Oral Contrast (04/03/2023 15:44)       ASSESSMENT AND PLAN:  Diagnoses and all orders for this visit:    1. Pelvic pain (Primary)  -     US Pelvis Complete; Future    2. Cyst of right ovary  -     US Pelvis Complete; Future    3. Menorrhagia with regular cycle            Follow Up:  Return in about 4 weeks (around 6/8/2023) for pls sched sono in 4 wks and f/u w/me i have placed order.          Maria Alejandra Chaparro,   05/11/2023    [facility]  [address] [place] [address]  Dept: [phone]  Dept Fax: [phone]  Loc: [phone]  Loc Fax: [phone]       "

## 2023-09-18 ENCOUNTER — TRANSCRIBE ORDERS (OUTPATIENT)
Dept: ADMINISTRATIVE | Facility: HOSPITAL | Age: 33
End: 2023-09-18
Payer: COMMERCIAL

## 2023-09-18 DIAGNOSIS — R19.01 RIGHT UPPER QUADRANT ABDOMINAL MASS: Primary | ICD-10-CM

## 2023-10-06 ENCOUNTER — TELEPHONE (OUTPATIENT)
Dept: OBSTETRICS AND GYNECOLOGY | Facility: CLINIC | Age: 33
End: 2023-10-06
Payer: COMMERCIAL

## 2023-10-06 DIAGNOSIS — Z32.00 PREGNANCY EXAMINATION OR TEST, PREGNANCY UNCONFIRMED: Primary | ICD-10-CM

## 2023-10-06 DIAGNOSIS — O36.80X0 PREGNANCY WITH INCONCLUSIVE FETAL VIABILITY, SINGLE OR UNSPECIFIED FETUS: ICD-10-CM

## 2023-10-06 NOTE — TELEPHONE ENCOUNTER
Caller: Sundar Guzman    Relationship to patient: Self    Best call back number: [phone]    Patient is needing: PATIENT CALLED AND STATED THAT SHE WENT TO [facility] EMERGENCY DEPT 10/5/23 DUE TO BLEEDING FROM RIGHT SIDE OF NOSE AND RIGHT EAR AFTER BENDING OVER - PATIENT WAS RECENTLY TREATED FOR EAR INFECTIONS - PATIENT HAS HISTORY OF HODGKIN'S LYMPHOMA AND PATIENT HAS EPILEPSY - PATIENT STATED THAT SHE ALSO FELL RECENTLY AND HIT HER HEAD - E.D. DID LABS AND STATED THAT PATIENT IS PREGNANT, WHICH SHE DIDN'T REALIZE WAS A POSSIBILITY - PATIENT UNSURE OF LMP, BUT KNOWS THAT SHE DIDN'T HAVE A PERIOD IN SEPTEMBER    HUB SCHED FIRST AVAIL NEW OB APPT W/ DR HOOKS FOR 11/3/23, BUT PATIENT WOULD LIKE TO KNOW IF SHE CAN BE SEEN ASAP - E.D. RECOMMENDED CT SCAN DUE TO POSSIBLE SKULL FRACTURE FROM FALLING, BUT WANTS PATIENT TO SEE OBGYN FOR PREGNANCY FIRST AND VERIFY THAT CT SCAN WOULD BE OKAY

## 2023-10-06 NOTE — TELEPHONE ENCOUNTER
I have scanned in patients records from her ER visit from [facility]. She is scheduled for her initial ob on 11/03. I have attached an order for an ultrasound and BHCG. Please let me know if there are any other recommendations due to her ER visit.

## 2023-10-09 ENCOUNTER — TRANSCRIBE ORDERS (OUTPATIENT)
Dept: ADMINISTRATIVE | Facility: HOSPITAL | Age: 33
End: 2023-10-09
Payer: COMMERCIAL

## 2023-10-09 ENCOUNTER — LAB (OUTPATIENT)
Dept: LAB | Facility: HOSPITAL | Age: 33
End: 2023-10-09
Payer: COMMERCIAL

## 2023-10-09 DIAGNOSIS — G40.909 SEIZURE DISORDER: ICD-10-CM

## 2023-10-09 DIAGNOSIS — Z34.90 PREGNANCY, UNSPECIFIED GESTATIONAL AGE: ICD-10-CM

## 2023-10-09 DIAGNOSIS — Z34.90 PREGNANCY, UNSPECIFIED GESTATIONAL AGE: Primary | ICD-10-CM

## 2023-10-09 LAB
ALBUMIN SERPL-MCNC: 3.9 G/DL (ref 3.5–5.2)
ALBUMIN/GLOB SERPL: 1.1 G/DL
ALP SERPL-CCNC: 65 U/L (ref 39–117)
ALT SERPL W P-5'-P-CCNC: 13 U/L (ref 1–33)
ANION GAP SERPL CALCULATED.3IONS-SCNC: 10.9 MMOL/L (ref 5–15)
AST SERPL-CCNC: 13 U/L (ref 1–32)
BASOPHILS # BLD AUTO: 0.04 10*3/MM3 (ref 0–0.2)
BASOPHILS NFR BLD AUTO: 0.4 % (ref 0–1.5)
BILIRUB SERPL-MCNC: <0.2 MG/DL (ref 0–1.2)
BUN SERPL-MCNC: 9 MG/DL (ref 6–20)
BUN/CREAT SERPL: 14.1 (ref 7–25)
CALCIUM SPEC-SCNC: 9.1 MG/DL (ref 8.6–10.5)
CHLORIDE SERPL-SCNC: 103 MMOL/L (ref 98–107)
CO2 SERPL-SCNC: 22.1 MMOL/L (ref 22–29)
CREAT SERPL-MCNC: 0.64 MG/DL (ref 0.57–1)
DEPRECATED RDW RBC AUTO: 40.9 FL (ref 37–54)
EGFRCR SERPLBLD CKD-EPI 2021: 120.6 ML/MIN/1.73
EOSINOPHIL # BLD AUTO: 0.33 10*3/MM3 (ref 0–0.4)
EOSINOPHIL NFR BLD AUTO: 3.3 % (ref 0.3–6.2)
ERYTHROCYTE [DISTWIDTH] IN BLOOD BY AUTOMATED COUNT: 13.5 % (ref 12.3–15.4)
GLOBULIN UR ELPH-MCNC: 3.6 GM/DL
GLUCOSE SERPL-MCNC: 82 MG/DL (ref 65–99)
HCG INTACT+B SERPL-ACNC: 4274 MIU/ML
HCG SERPL QL: POSITIVE
HCT VFR BLD AUTO: 36.2 % (ref 34–46.6)
HGB BLD-MCNC: 12.1 G/DL (ref 12–15.9)
IMM GRANULOCYTES # BLD AUTO: 0.03 10*3/MM3 (ref 0–0.05)
IMM GRANULOCYTES NFR BLD AUTO: 0.3 % (ref 0–0.5)
LYMPHOCYTES # BLD AUTO: 2.95 10*3/MM3 (ref 0.7–3.1)
LYMPHOCYTES NFR BLD AUTO: 29.4 % (ref 19.6–45.3)
MCH RBC QN AUTO: 28 PG (ref 26.6–33)
MCHC RBC AUTO-ENTMCNC: 33.4 G/DL (ref 31.5–35.7)
MCV RBC AUTO: 83.8 FL (ref 79–97)
MONOCYTES # BLD AUTO: 0.7 10*3/MM3 (ref 0.1–0.9)
MONOCYTES NFR BLD AUTO: 7 % (ref 5–12)
NEUTROPHILS NFR BLD AUTO: 5.97 10*3/MM3 (ref 1.7–7)
NEUTROPHILS NFR BLD AUTO: 59.6 % (ref 42.7–76)
NRBC BLD AUTO-RTO: 0 /100 WBC (ref 0–0.2)
PLATELET # BLD AUTO: 367 10*3/MM3 (ref 140–450)
PMV BLD AUTO: 11.1 FL (ref 6–12)
POTASSIUM SERPL-SCNC: 3.6 MMOL/L (ref 3.5–5.2)
PROT SERPL-MCNC: 7.5 G/DL (ref 6–8.5)
RBC # BLD AUTO: 4.32 10*6/MM3 (ref 3.77–5.28)
SODIUM SERPL-SCNC: 136 MMOL/L (ref 136–145)
WBC NRBC COR # BLD: 10.02 10*3/MM3 (ref 3.4–10.8)

## 2023-10-09 PROCEDURE — 80175 DRUG SCREEN QUAN LAMOTRIGINE: CPT

## 2023-10-09 PROCEDURE — 80053 COMPREHEN METABOLIC PANEL: CPT

## 2023-10-09 PROCEDURE — 80177 DRUG SCRN QUAN LEVETIRACETAM: CPT

## 2023-10-09 PROCEDURE — 84703 CHORIONIC GONADOTROPIN ASSAY: CPT

## 2023-10-09 PROCEDURE — 84702 CHORIONIC GONADOTROPIN TEST: CPT

## 2023-10-09 PROCEDURE — 85025 COMPLETE CBC W/AUTO DIFF WBC: CPT

## 2023-10-09 PROCEDURE — 36415 COLL VENOUS BLD VENIPUNCTURE: CPT

## 2023-10-11 LAB
LAMOTRIGINE SERPL-MCNC: <1 UG/ML (ref 2–20)
LEVETIRACETAM SERPL-MCNC: <2 UG/ML (ref 10–40)

## 2023-10-23 NOTE — PROGRESS NOTES
OBSTETRIC HISTORY AND PHYSICAL     Subjective:  Sundar Guzman is a 33 y.o.  at 7w0d  here for her new OB visit. Patient pregnancy is dated by a first trimester ultrasound today. She is taking her prenatal vitamins.Reports no loss of fluid or vaginal bleeding.No hx of genetic, bleeding, endocrine, chromosome disorder in both patient and partner. No history of multiple gestations, congenital anomalies or mental retardation.    Patient has a long medical history that includes lymphoma status postchemotherapy, epilepsy with daily seizures, lupus, history of preeclampsia in her prior pregnancy with delivery at 34 weeks and an emergency  section.  Patient was under the impression she cannot get pregnant.  She states this is a miracle baby.     FOB involvement yes    Discussed adequate water intake, food guidelines/weight gain, limit caffiene to less than 200mg daily.   Discussed food, activities to avoid. Discussed seatbelt safety.   Reviewed safe meds in pregnancy handout.  Taking PNV: yes  Smoking cessation needed: no, former smoker    Reviewed and updated:  OBHx, GYNHx (STDs), PMHx, Medications, Allergies, PSHx, Social Hx, Preventative Hx (PAP), Hx of abuse/safe environment, Vaccine Hx including hx of chickenpox or vaccine, Genetic Hx (pt, FOB, both families).        OB History    Para Term  AB Living   3 1   1 1 1   SAB IAB Ectopic Molar Multiple Live Births             1      # Outcome Date GA Lbr Justin/2nd Weight Sex Delivery Anes PTL Lv   3 Current            2 AB 02/09/10 12w0d   F CS-Unspec      1  02/09/10 34w0d  3459 g (7 lb 10 oz) M CS-Unspec EPI Y SUE      Complications: Long labor, Toxemia in pregnancy, Pre-eclampsia     Past Medical History:   Diagnosis Date    Abnormal Pap smear of cervix     Anxiety     Cervical cancer     Cervical cancer     Depression     Diabetes mellitus     Disease of thyroid gland     Epilepsy     Hashimoto's disease     Hodgkin's lymphoma      HPV (human papilloma virus) infection     Lupus     Migraine     Ovarian cyst     Seizures     Trauma      Past Surgical History:   Procedure Laterality Date    BRAIN SURGERY      CERVICAL CONE BIOPSY       SECTION      FACIAL RECONSTRUCTION SURGERY      LEEP      LYMPH NODE DISSECTION      removal of lymph nodes under arms    TONSILLECTOMY      WISDOM TOOTH EXTRACTION       Family History   Problem Relation Age of Onset    Pancreatic cancer Father     Diabetes Father     Diabetes Mother     Hypertension Mother     Diabetes Paternal Grandmother     Ovarian cancer Maternal Grandmother     Pulmonary embolism Maternal Grandmother     Deep vein thrombosis Maternal Grandmother     Heart failure Maternal Grandfather     Prostate cancer Maternal Grandfather     Breast cancer Maternal Aunt     Uterine cancer Neg Hx     Colon cancer Neg Hx      Allergies   Allergen Reactions    Other Rash     Pt sts allergic to Tide laundry detergent       Social History     Socioeconomic History    Marital status: Significant Other   Tobacco Use    Smoking status: Every Day     Packs/day: .1     Types: Cigarettes   Vaping Use    Vaping Use: Former    Substances: THC, CBD    Devices: Disposable   Substance and Sexual Activity    Alcohol use: Not Currently     Comment: occassionally    Drug use: Yes     Types: Marijuana     Comment: Legal marijuana    Sexual activity: Yes     Partners: Male     Birth control/protection: None         ROS:  General ROS: negative for - chills or fatigue  Respiratory ROS: negative for - cough or hemoptysis  Cardiovascular ROS: negative for - chest pain or dyspnea on exertion  Gastrointestinal ROS: negative for - abdominal pain or appetite loss  Musculoskeletal ROS: negative for - gait disturbance or joint pain  Neurological ROS: negative for - behavioral changes or bowel and bladder control changes  Dermatological ROS: negative for rashes or lesions     Objective:  Physical Exam:   Vitals:    10/25/23  0849   BP: 121/80       General appearance - alert, well appearing, and in no distress  Mental status - alert, oriented to person, place, and time  Heart- Regular rate and rhythm   Lungs- No labored breathing  Breasts- Deferred to annual  Abdomen- Soft, Gravid uterus, non-tender  Extremeties: Normal ROM, Negative swelling or cyanosis  Uterus- Bedside US consistent with dates.  -160..   Adnexa- Normal, no mass, non tender    Counseling:   Nutrition discussed, calories, activity/exercise in pregnancy  Discussed dietary restrictions/safety food preparation in pregnancy  Reviewed what to expect prenatal visits, office providers and [facility] hospitalists  Appropriate trimester precautions provided, N/V, vag bleeding, cramping  Questions answered  Discussed healthy weight gain.  Also discussed increased water intake, 200mg of caffeine daily, exercise and healthy eating habits.  Encouraged patient to consider breastfeeding. Discussed that it is recommended by the CDC and WHO that women exclusively breastfeed for the first 6 months and continue to breastfeed up to one year. Discussed the health benefits of breastfeeding, including increased immune systems in infants, reduced risk of food allergies, and reduced risk of chronic disease as an adult. Maternal benefits include more PP weight loss, reduced risk of PP depression, breast/ovarian cancer risk reduced.     ASSESSMENT/PLAN:   Diagnoses and all orders for this visit:    1. High risk pregnancy, antepartum (Primary)  Assessment & Plan:  EMILE finalized: 6/12/2024  Dating ultrasound 10/25/2023: CRL 0.95 cm,  bpm, 7 weeks 0 days EMILE 6/12/2024     Genetic testing (NIPS-Quad)/CF/AFP: desires     COVID: recommended  Flu: Recommended  Tdap: 27-36 weeks     Rhogam: Opos  ?Sterilization:     Anatomy US:  FU US:     PROBLEM LIST/PLAN:   G1 History of preeclampsia with delivery at 34 weeks-baseline labs obtained today, recommend ASA 81 daily starting at 12  weeks  Epilepsy-daily seizures, follows closely with Dr. Barry neurology  History of lymphoma status post chemotherapy  Pregestational diabetes-stopped metformin, advised to monitor BG 4d daily  Hashimoto's thyroiditis-TSH ordered  History of     Orders:  -     glucose monitor monitoring kit; Check BS FOUR times per day (fasting and 2hrs after meals) and record.  Bring blood sugar record to next office visit.  Dispense: 1 each; Refill: 0  -     glucose blood test strip; Check BS 4x/day as instructed.  Dispense: 120 each; Refill: 1  -     Lancets misc; Use 120 each 4 (Four) Times a Day. Check blood sugars four times daily as directed  Dispense: 120 each; Refill: 3  -     Alcohol Swabs pads; Use to clean fingers before checking BS 4 times per day and PRN  Dispense: 120 each; Refill: 3  -     Hemoglobin A1c  -     Chlamydia trachomatis, Neisseria gonorrhoeae, PCR - , Urine, Clean Catch    2. Pregnancy with inconclusive fetal viability, single or unspecified fetus  -     Urine Drug Screen - Urine, Clean Catch; Future  -     Chlamydia trachomatis, Neisseria gonorrhoeae, PCR - , Urine, Clean Catch    3. Supervision of other normal pregnancy, antepartum  -     POC Urinalysis Dipstick  -     Urine Culture - Urine, Urine, Clean Catch  -     Hemoglobinopathy Fractionation Carver  -     OB Panel With HIV; Future  -     Comprehensive Metabolic Panel  -     Protein / Creatinine Ratio, Urine - Urine, Clean Catch  -     OB Panel With HIV  -     Chlamydia trachomatis, Neisseria gonorrhoeae, PCR - , Urine, Clean Catch    4. Hashimoto's thyroiditis  -     TSH  -     T4, Free  -     Chlamydia trachomatis, Neisseria gonorrhoeae, PCR - , Urine, Clean Catch    5. Pregnancy examination or test, pregnancy unconfirmed  -     HCG, Quantitative, Pregnancy (Hospital Lab Only)  -     Chlamydia trachomatis, Neisseria gonorrhoeae, PCR - , Urine, Clean Catch    6. Seizure disorder    7. Hx of preeclampsia, prior pregnancy, currently  pregnant    8. Maternal pregestational diabetes classes B through R, antepartum  Assessment & Plan:  Previously on metformin, no longer taking  Advised to monitor blood glucoses, will send glucometer supplies  Discussed fasting less than 95, 2 hours postprandial less than 120      9. History of lymphoma    10. Marijuana abuse    Discussed in detail about complex high risk pregnancy.  Patient already has a referral for MFM at [facility].  Patient plans to deliver in [place] due to complicated history with epilepsy.  I advised that it would be better to have consistent care in [place] for the duration of the pregnancy due to a very complex history.  We will do new OB panel lab work today.  Ultrasound reviewed with the patient with live intrauterine pregnancy.  Advised to start checking blood glucoses and will send supplies to the pharmacy.      Return in about 4 weeks (around 11/22/2023).    We have gone over the expected prenatal care to include the timing and content of visits including the anatomy ultrasound.  We discussed the content of the anatomy ultrasound and its limitations (the fact that ultrasound in general may not see up to 40% of abnormalities).  I informed her how to contact the office and/or on call person in the event of any problems and encouraged her to do so when she feels it is necessary.  We then spent time answering her questions which she indicated were answered to her satisfaction.    Hilda Mercedes DO  10/25/2023 14:04 EDT

## 2023-10-25 ENCOUNTER — INITIAL PRENATAL (OUTPATIENT)
Dept: OBSTETRICS AND GYNECOLOGY | Facility: CLINIC | Age: 33
End: 2023-10-25
Payer: COMMERCIAL

## 2023-10-25 ENCOUNTER — PATIENT OUTREACH (OUTPATIENT)
Dept: LABOR AND DELIVERY | Facility: HOSPITAL | Age: 33
End: 2023-10-25
Payer: COMMERCIAL

## 2023-10-25 ENCOUNTER — TELEPHONE (OUTPATIENT)
Dept: OBSTETRICS AND GYNECOLOGY | Facility: CLINIC | Age: 33
End: 2023-10-25

## 2023-10-25 ENCOUNTER — REFERRAL TRIAGE (OUTPATIENT)
Dept: LABOR AND DELIVERY | Facility: HOSPITAL | Age: 33
End: 2023-10-25
Payer: COMMERCIAL

## 2023-10-25 VITALS — DIASTOLIC BLOOD PRESSURE: 80 MMHG | SYSTOLIC BLOOD PRESSURE: 121 MMHG | BODY MASS INDEX: 46.59 KG/M2 | WEIGHT: 271.4 LBS

## 2023-10-25 DIAGNOSIS — Z32.00 PREGNANCY EXAMINATION OR TEST, PREGNANCY UNCONFIRMED: ICD-10-CM

## 2023-10-25 DIAGNOSIS — O24.319 MATERNAL PREGESTATIONAL DIABETES CLASSES B THROUGH R, ANTEPARTUM: ICD-10-CM

## 2023-10-25 DIAGNOSIS — E06.3 HASHIMOTO'S THYROIDITIS: ICD-10-CM

## 2023-10-25 DIAGNOSIS — G40.909 SEIZURE DISORDER: ICD-10-CM

## 2023-10-25 DIAGNOSIS — O09.90 HIGH RISK PREGNANCY, ANTEPARTUM: Primary | ICD-10-CM

## 2023-10-25 DIAGNOSIS — O09.299 HX OF PREECLAMPSIA, PRIOR PREGNANCY, CURRENTLY PREGNANT: ICD-10-CM

## 2023-10-25 DIAGNOSIS — Z85.72 HISTORY OF LYMPHOMA: ICD-10-CM

## 2023-10-25 DIAGNOSIS — F12.10 MARIJUANA ABUSE: ICD-10-CM

## 2023-10-25 DIAGNOSIS — O36.80X0 PREGNANCY WITH INCONCLUSIVE FETAL VIABILITY, SINGLE OR UNSPECIFIED FETUS: ICD-10-CM

## 2023-10-25 DIAGNOSIS — Z34.80 SUPERVISION OF OTHER NORMAL PREGNANCY, ANTEPARTUM: ICD-10-CM

## 2023-10-25 LAB
ABO GROUP BLD: NORMAL
ALBUMIN SERPL-MCNC: 4.1 G/DL (ref 3.5–5.2)
ALBUMIN/GLOB SERPL: 1 G/DL
ALP SERPL-CCNC: 71 U/L (ref 39–117)
ALT SERPL W P-5'-P-CCNC: 11 U/L (ref 1–33)
AMPHET+METHAMPHET UR QL: NEGATIVE
ANION GAP SERPL CALCULATED.3IONS-SCNC: 12 MMOL/L (ref 5–15)
AST SERPL-CCNC: 11 U/L (ref 1–32)
BARBITURATES UR QL SCN: NEGATIVE
BASOPHILS # BLD AUTO: 0.04 10*3/MM3 (ref 0–0.2)
BASOPHILS NFR BLD AUTO: 0.3 % (ref 0–1.5)
BENZODIAZ UR QL SCN: NEGATIVE
BILIRUB SERPL-MCNC: 0.2 MG/DL (ref 0–1.2)
BLD GP AB SCN SERPL QL: NEGATIVE
BUN SERPL-MCNC: 13 MG/DL (ref 6–20)
BUN/CREAT SERPL: 25.5 (ref 7–25)
C TRACH RRNA CVX QL NAA+PROBE: NOT DETECTED
CALCIUM SPEC-SCNC: 9.9 MG/DL (ref 8.6–10.5)
CANNABINOIDS SERPL QL: POSITIVE
CHLORIDE SERPL-SCNC: 102 MMOL/L (ref 98–107)
CO2 SERPL-SCNC: 22 MMOL/L (ref 22–29)
COCAINE UR QL: NEGATIVE
CREAT SERPL-MCNC: 0.51 MG/DL (ref 0.57–1)
DEPRECATED RDW RBC AUTO: 40.5 FL (ref 37–54)
EGFRCR SERPLBLD CKD-EPI 2021: 127.4 ML/MIN/1.73
EOSINOPHIL # BLD AUTO: 0.29 10*3/MM3 (ref 0–0.4)
EOSINOPHIL NFR BLD AUTO: 2.2 % (ref 0.3–6.2)
ERYTHROCYTE [DISTWIDTH] IN BLOOD BY AUTOMATED COUNT: 13.4 % (ref 12.3–15.4)
FENTANYL UR-MCNC: NEGATIVE NG/ML
GLOBULIN UR ELPH-MCNC: 4 GM/DL
GLUCOSE SERPL-MCNC: 95 MG/DL (ref 65–99)
GLUCOSE UR STRIP-MCNC: NEGATIVE MG/DL
HBA1C MFR BLD: 5.9 % (ref 4.8–5.6)
HBV SURFACE AG SERPL QL IA: NORMAL
HCG INTACT+B SERPL-ACNC: NORMAL MIU/ML
HCT VFR BLD AUTO: 37 % (ref 34–46.6)
HCV AB SER DONR QL: NORMAL
HGB BLD-MCNC: 12.3 G/DL (ref 12–15.9)
HIV1+2 AB SER QL: NORMAL
IMM GRANULOCYTES # BLD AUTO: 0.06 10*3/MM3 (ref 0–0.05)
IMM GRANULOCYTES NFR BLD AUTO: 0.5 % (ref 0–0.5)
LYMPHOCYTES # BLD AUTO: 2.1 10*3/MM3 (ref 0.7–3.1)
LYMPHOCYTES NFR BLD AUTO: 16 % (ref 19.6–45.3)
MCH RBC QN AUTO: 27.8 PG (ref 26.6–33)
MCHC RBC AUTO-ENTMCNC: 33.2 G/DL (ref 31.5–35.7)
MCV RBC AUTO: 83.7 FL (ref 79–97)
METHADONE UR QL SCN: NEGATIVE
MONOCYTES # BLD AUTO: 0.72 10*3/MM3 (ref 0.1–0.9)
MONOCYTES NFR BLD AUTO: 5.5 % (ref 5–12)
N GONORRHOEA RRNA SPEC QL NAA+PROBE: NOT DETECTED
NEUTROPHILS NFR BLD AUTO: 75.5 % (ref 42.7–76)
NEUTROPHILS NFR BLD AUTO: 9.92 10*3/MM3 (ref 1.7–7)
NRBC BLD AUTO-RTO: 0 /100 WBC (ref 0–0.2)
OPIATES UR QL: NEGATIVE
OXYCODONE UR QL SCN: NEGATIVE
PLATELET # BLD AUTO: 363 10*3/MM3 (ref 140–450)
PMV BLD AUTO: 11.2 FL (ref 6–12)
POTASSIUM SERPL-SCNC: 3.7 MMOL/L (ref 3.5–5.2)
PROT SERPL-MCNC: 8.1 G/DL (ref 6–8.5)
PROT UR STRIP-MCNC: ABNORMAL MG/DL
RBC # BLD AUTO: 4.42 10*6/MM3 (ref 3.77–5.28)
RH BLD: POSITIVE
SODIUM SERPL-SCNC: 136 MMOL/L (ref 136–145)
T PALLIDUM IGG SER QL: NORMAL
T4 FREE SERPL-MCNC: 1.1 NG/DL (ref 0.93–1.7)
TSH SERPL DL<=0.05 MIU/L-ACNC: 1.89 UIU/ML (ref 0.27–4.2)
WBC NRBC COR # BLD: 13.13 10*3/MM3 (ref 3.4–10.8)

## 2023-10-25 PROCEDURE — 80307 DRUG TEST PRSMV CHEM ANLYZR: CPT | Performed by: STUDENT IN AN ORGANIZED HEALTH CARE EDUCATION/TRAINING PROGRAM

## 2023-10-25 PROCEDURE — 86803 HEPATITIS C AB TEST: CPT | Performed by: STUDENT IN AN ORGANIZED HEALTH CARE EDUCATION/TRAINING PROGRAM

## 2023-10-25 PROCEDURE — 87591 N.GONORRHOEAE DNA AMP PROB: CPT | Performed by: STUDENT IN AN ORGANIZED HEALTH CARE EDUCATION/TRAINING PROGRAM

## 2023-10-25 PROCEDURE — 84443 ASSAY THYROID STIM HORMONE: CPT | Performed by: STUDENT IN AN ORGANIZED HEALTH CARE EDUCATION/TRAINING PROGRAM

## 2023-10-25 PROCEDURE — G0432 EIA HIV-1/HIV-2 SCREEN: HCPCS | Performed by: STUDENT IN AN ORGANIZED HEALTH CARE EDUCATION/TRAINING PROGRAM

## 2023-10-25 PROCEDURE — 80053 COMPREHEN METABOLIC PANEL: CPT | Performed by: STUDENT IN AN ORGANIZED HEALTH CARE EDUCATION/TRAINING PROGRAM

## 2023-10-25 PROCEDURE — 86900 BLOOD TYPING SEROLOGIC ABO: CPT | Performed by: STUDENT IN AN ORGANIZED HEALTH CARE EDUCATION/TRAINING PROGRAM

## 2023-10-25 PROCEDURE — 86901 BLOOD TYPING SEROLOGIC RH(D): CPT | Performed by: STUDENT IN AN ORGANIZED HEALTH CARE EDUCATION/TRAINING PROGRAM

## 2023-10-25 PROCEDURE — 84702 CHORIONIC GONADOTROPIN TEST: CPT | Performed by: STUDENT IN AN ORGANIZED HEALTH CARE EDUCATION/TRAINING PROGRAM

## 2023-10-25 PROCEDURE — 85025 COMPLETE CBC W/AUTO DIFF WBC: CPT | Performed by: STUDENT IN AN ORGANIZED HEALTH CARE EDUCATION/TRAINING PROGRAM

## 2023-10-25 PROCEDURE — 87491 CHLMYD TRACH DNA AMP PROBE: CPT | Performed by: STUDENT IN AN ORGANIZED HEALTH CARE EDUCATION/TRAINING PROGRAM

## 2023-10-25 PROCEDURE — 87340 HEPATITIS B SURFACE AG IA: CPT | Performed by: STUDENT IN AN ORGANIZED HEALTH CARE EDUCATION/TRAINING PROGRAM

## 2023-10-25 PROCEDURE — 84439 ASSAY OF FREE THYROXINE: CPT | Performed by: STUDENT IN AN ORGANIZED HEALTH CARE EDUCATION/TRAINING PROGRAM

## 2023-10-25 PROCEDURE — 87086 URINE CULTURE/COLONY COUNT: CPT | Performed by: STUDENT IN AN ORGANIZED HEALTH CARE EDUCATION/TRAINING PROGRAM

## 2023-10-25 PROCEDURE — 86780 TREPONEMA PALLIDUM: CPT | Performed by: STUDENT IN AN ORGANIZED HEALTH CARE EDUCATION/TRAINING PROGRAM

## 2023-10-25 PROCEDURE — 83036 HEMOGLOBIN GLYCOSYLATED A1C: CPT | Performed by: STUDENT IN AN ORGANIZED HEALTH CARE EDUCATION/TRAINING PROGRAM

## 2023-10-25 PROCEDURE — 86850 RBC ANTIBODY SCREEN: CPT | Performed by: STUDENT IN AN ORGANIZED HEALTH CARE EDUCATION/TRAINING PROGRAM

## 2023-10-25 RX ORDER — BLOOD PRESSURE TEST KIT
KIT MISCELLANEOUS
Qty: 120 EACH | Refills: 3 | Status: SHIPPED | OUTPATIENT
Start: 2023-10-25

## 2023-10-25 RX ORDER — LANCETS 30 GAUGE
120 EACH MISCELLANEOUS 4 TIMES DAILY
Qty: 120 EACH | Refills: 3 | Status: SHIPPED | OUTPATIENT
Start: 2023-10-25

## 2023-10-25 RX ORDER — BLOOD-GLUCOSE METER
KIT MISCELLANEOUS
Qty: 1 EACH | Refills: 0 | Status: SHIPPED | OUTPATIENT
Start: 2023-10-25

## 2023-10-25 NOTE — PATIENT INSTRUCTIONS
Venipuncture Blood Specimen Collection  Venipuncture performed in left arm by Francheska Mann with good hemostasis. Patient tolerated the procedure well without complications.   10/25/23   Francheska Mann

## 2023-10-25 NOTE — OUTREACH NOTE
[facility]  Enrollment    Current Estimated Gestational Age: 7w0d    Questions/Answers      Flowsheet Row Responses   Would like to participate? Yes            Intake Assessment      Flowsheet Row Responses   Best Method for Contacting Cell   Able to keep appointments as scheduled Yes                Met with patient and Rickey at OB visit. Doing well. May need to deliver in [place] due to multiple medical conditions. Will follow up with at next appointment.      Tobacco, Alcohol, and Drug History     reports that she has been smoking cigarettes. She has been smoking an average of .1 packs per day. She does not have any smokeless tobacco history on file.   reports that she does not currently use alcohol.   reports current drug use. Drug: Marijuana.    Sophia Kraft RN  Maternity Nurse Navigator    10/25/2023, 10:51 EDT

## 2023-10-25 NOTE — ASSESSMENT & PLAN NOTE
Previously on metformin, no longer taking  Advised to monitor blood glucoses, will send glucometer supplies  Discussed fasting less than 95, 2 hours postprandial less than 120

## 2023-10-25 NOTE — ASSESSMENT & PLAN NOTE
EMILE finalized: 2024  Dating ultrasound 10/25/2023: CRL 0.95 cm,  bpm, 7 weeks 0 days EMILE 2024     Genetic testing (NIPS-Quad)/CF/AFP: desires     COVID: recommended  Flu: Recommended  Tdap: 27-36 weeks     Rhogam: Opos  ?Sterilization:     Anatomy US:  FU US:     PROBLEM LIST/PLAN:   G1 History of preeclampsia with delivery at 34 weeks-baseline labs obtained today, recommend ASA 81 daily starting at 12 weeks  Epilepsy-daily seizures, follows closely with Dr. Barry neurology  History of lymphoma status post chemotherapy  Pregestational diabetes-stopped metformin, advised to monitor BG 4d daily  Hashimoto's thyroiditis-TSH ordered  History of

## 2023-10-25 NOTE — TELEPHONE ENCOUNTER
[facility] - QUESTIONS ABOUT REFERRAL TO [facility]ONDA  ASKED PATIENT TO CALL [phone] OPTION #3

## 2023-10-26 LAB
BACTERIA SPEC AEROBE CULT: NO GROWTH
HGB A MFR BLD ELPH: 97.5 % (ref 96.4–98.8)
HGB A2 MFR BLD ELPH: 2.5 % (ref 1.8–3.2)
HGB F MFR BLD ELPH: 0 % (ref 0–2)
HGB FRACT BLD-IMP: NORMAL
HGB S MFR BLD ELPH: 0 %
RUBV IGG SERPL IA-ACNC: 6.13 INDEX

## 2023-10-27 ENCOUNTER — TELEPHONE (OUTPATIENT)
Dept: OBSTETRICS AND GYNECOLOGY | Facility: CLINIC | Age: 33
End: 2023-10-27
Payer: COMMERCIAL

## 2023-10-27 NOTE — TELEPHONE ENCOUNTER
Patient is concerned about the results on her CBC. She states she has a history of cancer and is worried about the elevated WBC and the abnormal counts on the differential. Please advise.

## 2023-10-27 NOTE — TELEPHONE ENCOUNTER
Patient advised that it is an expected result/finding during pregnancy and understands she can discuss further at her next scheduled appointment.

## 2023-11-13 ENCOUNTER — TELEPHONE (OUTPATIENT)
Dept: OBSTETRICS AND GYNECOLOGY | Facility: CLINIC | Age: 33
End: 2023-11-13
Payer: COMMERCIAL

## 2023-11-15 ENCOUNTER — HOSPITAL ENCOUNTER (OUTPATIENT)
Facility: HOSPITAL | Age: 33
Discharge: HOME | End: 2023-11-15
Admitting: NURSE PRACTITIONER
Payer: COMMERCIAL

## 2023-11-15 ENCOUNTER — TRANSCRIBE ORDERS (OUTPATIENT)
Dept: ADMINISTRATIVE | Facility: HOSPITAL | Age: 33
End: 2023-11-15
Payer: COMMERCIAL

## 2023-11-15 DIAGNOSIS — O22.30 DEEP PHLEBOTHROMBOSIS IN PREGNANCY, UNSPECIFIED TRIMESTER: Primary | ICD-10-CM

## 2023-11-15 LAB

## 2023-11-15 PROCEDURE — 93970 EXTREMITY STUDY: CPT

## 2023-12-28 ENCOUNTER — OFFICE VISIT (OUTPATIENT)
Dept: OTOLARYNGOLOGY | Facility: CLINIC | Age: 33
End: 2023-12-28
Payer: COMMERCIAL

## 2023-12-28 VITALS — BODY MASS INDEX: 48.14 KG/M2 | TEMPERATURE: 97.6 F | WEIGHT: 282 LBS | HEIGHT: 64 IN

## 2023-12-28 DIAGNOSIS — H93.13 TINNITUS OF BOTH EARS: ICD-10-CM

## 2023-12-28 DIAGNOSIS — M26.609 TEMPOROMANDIBULAR JOINT DISORDER: ICD-10-CM

## 2023-12-28 DIAGNOSIS — H92.03 OTALGIA OF BOTH EARS: Primary | ICD-10-CM

## 2023-12-28 DIAGNOSIS — K11.8 MASS OF RIGHT PAROTID GLAND: ICD-10-CM

## 2023-12-28 DIAGNOSIS — M79.18 MYOFASCIAL MUSCLE PAIN: ICD-10-CM

## 2023-12-28 RX ORDER — ETONOGESTREL 68 MG/1
IMPLANT SUBCUTANEOUS
COMMUNITY
End: 2023-12-28

## 2023-12-28 RX ORDER — FLUTICASONE PROPIONATE 50 MCG
SPRAY, SUSPENSION (ML) NASAL
COMMUNITY
Start: 2023-11-03

## 2023-12-28 RX ORDER — INSULIN DETEMIR 100 [IU]/ML
INJECTION, SOLUTION SUBCUTANEOUS
COMMUNITY
Start: 2023-12-12

## 2023-12-28 RX ORDER — BUPROPION HYDROCHLORIDE 300 MG/1
TABLET ORAL
COMMUNITY
Start: 2023-10-02

## 2023-12-28 RX ORDER — LAMOTRIGINE 100 MG/1
TABLET ORAL
COMMUNITY
Start: 2023-10-23

## 2023-12-28 RX ORDER — ASPIRIN 81 MG/1
81 TABLET ORAL DAILY
COMMUNITY
Start: 2023-11-16

## 2023-12-28 RX ORDER — FOLIC ACID 1 MG/1
TABLET ORAL
COMMUNITY
Start: 2023-10-23

## 2023-12-28 RX ORDER — PROCHLORPERAZINE 25 MG/1
SUPPOSITORY RECTAL
COMMUNITY
Start: 2023-12-15

## 2023-12-28 RX ORDER — PNV NO.95/FERROUS FUM/FOLIC AC 28MG-0.8MG
TABLET ORAL
COMMUNITY
Start: 2023-10-27

## 2023-12-28 RX ORDER — GLUCAGON 3 MG/1
POWDER NASAL
COMMUNITY
Start: 2023-12-20

## 2023-12-28 RX ORDER — ENOXAPARIN SODIUM 100 MG/ML
INJECTION SUBCUTANEOUS
COMMUNITY
Start: 2023-12-11

## 2023-12-28 RX ORDER — INSULIN LISPRO 100 [IU]/ML
INJECTION, SOLUTION INTRAVENOUS; SUBCUTANEOUS
COMMUNITY
Start: 2023-12-12

## 2023-12-28 RX ORDER — LEVETIRACETAM 1000 MG/1
TABLET ORAL
COMMUNITY
Start: 2023-10-26

## 2023-12-28 RX ORDER — INSULIN PMP CART,AUT,G6/7,CNTR
EACH SUBCUTANEOUS
COMMUNITY
Start: 2023-12-13
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
## 2024-02-19 ENCOUNTER — HOSPITAL ENCOUNTER (OUTPATIENT)
Dept: ULTRASOUND IMAGING | Facility: HOSPITAL | Age: 34
Discharge: HOME | End: 2024-02-19
Admitting: OTOLARYNGOLOGY
Payer: COMMERCIAL

## 2024-02-19 DIAGNOSIS — K11.8 MASS OF RIGHT PAROTID GLAND: ICD-10-CM

## 2024-02-19 PROCEDURE — 76536 US EXAM OF HEAD AND NECK: CPT

## 2024-02-26 ENCOUNTER — TELEPHONE (OUTPATIENT)
Dept: OTOLARYNGOLOGY | Facility: CLINIC | Age: 34
End: 2024-02-26